# Patient Record
Sex: MALE | Race: BLACK OR AFRICAN AMERICAN | NOT HISPANIC OR LATINO | URBAN - METROPOLITAN AREA
[De-identification: names, ages, dates, MRNs, and addresses within clinical notes are randomized per-mention and may not be internally consistent; named-entity substitution may affect disease eponyms.]

---

## 2020-02-28 DIAGNOSIS — M24.661 ARTHROFIBROSIS OF KNEE JOINT, RIGHT: Primary | ICD-10-CM

## 2020-03-17 ENCOUNTER — CLINICAL SUPPORT (OUTPATIENT)
Dept: REHABILITATION | Facility: HOSPITAL | Age: 35
End: 2020-03-17
Payer: MEDICAID

## 2020-03-17 DIAGNOSIS — R26.9 GAIT DIFFICULTY: ICD-10-CM

## 2020-03-17 DIAGNOSIS — M25.661 STIFFNESS OF RIGHT KNEE: ICD-10-CM

## 2020-03-17 DIAGNOSIS — M25.561 CHRONIC PAIN OF RIGHT KNEE: ICD-10-CM

## 2020-03-17 DIAGNOSIS — R29.898 WEAKNESS OF RIGHT LOWER EXTREMITY: ICD-10-CM

## 2020-03-17 DIAGNOSIS — G89.29 CHRONIC PAIN OF RIGHT KNEE: ICD-10-CM

## 2020-03-17 PROCEDURE — 97110 THERAPEUTIC EXERCISES: CPT | Mod: PN

## 2020-03-17 PROCEDURE — 97161 PT EVAL LOW COMPLEX 20 MIN: CPT | Mod: PN

## 2020-03-17 NOTE — PROGRESS NOTES
[facility]  Physical Therapy Initial Evaluation    Name: Suhail Elmore  Clinic Number: [id number]    Therapy Diagnosis:   Encounter Diagnoses   Name Primary?    Stiffness of right knee     Chronic pain of right knee     Gait difficulty     Weakness of right lower extremity      Physician: Mike, Provider    Physician Orders: PT Eval and Treat   Medical Diagnosis from Referral: Arthrofibrosis of knee joint, right  Evaluation Date: 3/17/2020  Authorization Period Expiration: 2-  Plan of Care Expiration: 6-  Visit # / Visits authorized: 1/ 1    Time In: 12:00 pm  Time Out: 1:00 pm  Total Billable Time: 60 minutes    Precautions: Standard    Subjective   Date of onset: 2018   History of current condition - Suhail reports: that he had surgery on R knee. He has surgery similar to TKA. Pt states he was walking better, but he fell down in 2019, which caused a lot complication to R knee. Pt states that after he fell down, his has build up heterotropic ossification. Pt ambulates with hinged brace and compression stocking and SPC. Pt has been wearing SPC for about 1 year and half. Pt states that physician order him to order brace at up to 90 deg. Pt was instructed wear brace at all time. Pt reports he cannot standing, walk or sit down for long period of time. Pt states he feels sharp pain at R add, anterior quad and anterior knee. Pt states only the R foot swollen up. Pt has not fallen for over 2 year.      Medical History:   Past Medical History:   Diagnosis Date    Hypertension        Surgical History:   Suhail Elmore  has no past surgical history on file.    Medications:   Suhail has a current medication list which includes the following prescription(s): furosemide, lisinopril-hydrochlorothiazide, and potassium chloride.    Allergies:   Review of patient's allergies indicates:  No Known Allergies     Imaging, bone scan films: None       Prior Therapy: Yes.   Social  History:  lives with their family  Occupation: No work   Prior Level of Function: Independent   Current Level of Function: ADL's and IADL's     Pain:  Current 5/10, worst 10/10, best 4/10   Location: right knee   Description: Sharp  Aggravating Factors: Sitting, Standing, Bending, Walking, Morning, Extension and Flexing  Easing Factors:  ibuprofen       Pts goals: improve R knee flexion and extension and get rid of cane.     Objective       Observation:       Posture Alignment: Increase knee valgus and R foot inversion     Sensation: intact to light touch    DTR: intact   GAIT DEVIATIONS: Suhail displays antalgic gait;unsteady gait;decreased step length;decreased base of support    Range of Motion:   Knee Left active   Flexion 115 deg    Extension 00     Knee Right active   Flexion 61   Extension Lacking -15 deg        Lower Extremity Strength   Right LE  Left LE    Knee extension: 4-/5 Knee extension: 4+/5   Knee flexion: 4-/5 Knee flexion: 4+/5   Hip flexion: 4+/5 Hip flexion: 4+/5   Hip extension:  4-/5 Hip extension: 4+/5   Hip abduction: 4-/5 Hip abduction: 4+/5   Hip adduction: 4-/5 Hip adduction 4+/5   Ankle dorsiflexion: 4+/5 Ankle dorsiflexion: 4+/5   Ankle plantarflexion: 4+/5 Ankle plantarflexion: 4+/5       Step down test: Positive R LE  Squat: Positive R LE  Single leg balance: Positive R LE    Joint Mobility:    Patellar sup./inf: Decrease mobility   Patellar med/lat decrease mobility     Palpation: Increase R anterior quadriceps pain and increase R patella  tendon pain.     CMS Impairment/Limitation/Restriction for FOTO Knee Survey  Status Limitation G-Code CMS Severity Modifier  Intake 42% 58% Current Status CK - At least 40 percent but less than 60 percent  Predicted 62% 38% Goal Status+ CJ - At least 20 percent but less than 40 percent  D/C Status CK **only report if this is a one time visit    TREATMENT   Treatment Time In: 12:30 pm  Treatment Time Out:12:55 pm  Total Treatment time separate  from Evaluation: 25 minutes    Suhail received therapeutic exercises to develop strength, endurance, ROM and flexibility for 25 minutes including:    Heel slides AAROM 5 min   Quad sets towel under heel 3x15  Heel props 5 min   Prone hip flexors stretches 5x30 sec   Seated AAROM knee flexion 3x10 hold count 5 sec   Pt education and proper technique to elevate and ice Stacey Jang received hot or coldpack for 5 minutes to R knee.    Home Exercises and Patient Education Provided    Education provided:   - Perform hep 2 times per day    Written Home Exercises Provided: Patient instructed to cont prior HEP.  Exercises were reviewed and Suhail was able to demonstrate them prior to the end of the session.  Suhail demonstrated good  understanding of the education provided.     See EMR under Patient Instructions for exercises provided 3/17/2020.    Assessment   Suhail is a 35 y.o. male referred to outpatient Physical Therapy with a medical diagnosis of Arthrofibrosis of knee joint, right. Pt presents to therapy with R knee stiffness, pain, and decrease ROM after have surgery about 2 years ago. Pt has heterotropic ossification of R quad. Pt ambulated with SPC, hinged brace, and compression stocking. Pt is lacking -15 deg of R knee extension, and pt presented with 61 deg of R knee flexion. During palpation, pt presented with increase R hip abd, quad, ITB, and hip add msucle tightness and tissue restriction. Increase for anterior quad pain at patella tendon and quad tendon. Pt will benefit from skilled PT services to decrease functional limitations.     Pt prognosis is Fair Plus  Pt will benefit from skilled outpatient Physical Therapy to address the deficits stated above and in the chart below, provide pt/family education, and to maximize pt's level of independence.     Plan of care discussed with patient: Yes  Pt's spiritual, cultural and educational needs considered and patient is agreeable to the plan of care and  goals as stated below:     Anticipated Barriers for therapy: stiffness    Medical Necessity is demonstrated by the following  History  Co-morbidities and personal factors that may impact the plan of care Co-morbidities:   prior hx of knee surgery     Personal Factors:   no deficits     low   Examination  Body Structures and Functions, activity limitations and participation restrictions that may impact the plan of care Body Regions:   knee    Body Systems:    ROM  gait    Participation Restrictions:   Community ambulation     Activity limitations:   Learning and applying knowledge  no deficits    General Tasks and Commands  no deficits    Communication  no deficits    Mobility  walking    Self care  no deficits    Domestic Life  house shores     Interactions/Relationships  no deficits    Life Areas  no deficits    Community and Social Life  community life  recreation and leisure         Complexity: low   Clinical Presentation stable and uncomplicated low   Decision Making/ Complexity Score: low       GOALS: Short Term Goals:  4 weeks  1.Report decreased in pain at worse less than  <   / =  5  /10  to increase tolerance for functional mobility.On going  2. Pt to improve R knee flexion range of motion to 70 deg and R knee extension to -10 deg to allow for improved functional mobility to allow for improvement in IADLs. .On going  3. Increased R knee MMT 1/2 grade to increase tolerance for ADL and work activities.On going  4. Pt to tolerate HEP to improve ROM and independence with ADL's.On going    Long Term Goals: 8 weeks  1.Report decreased in pain at worse less than  <   / =  2 /10  to increase tolerance for functional mobility.  2.Pt to improve R knee flexion range of motion to 70 deg and R knee extension to -10 deg to allow for improved functional mobility to allow for improvement in IADLs. .On going  3.Increased Rk nee MMT 1 grade to increase tolerance for ADL and work activities.  4. Pt will report At least 40  percent but less than 60 percent  on LEFS  to demonstrate increase in LE function with every day tasks.   5. Pt to be Independent with HEP to improve ROM and independence with ADL's    Plan   Plan of care Certification: 3/17/2020 to 6-.    Outpatient Physical Therapy 2 times weekly for 12 weeks to include the following interventions: Gait Training, Manual Therapy, Moist Heat/ Ice, Neuromuscular Re-ed, Patient Education, Therapeutic Activites and Therapeutic Exercise.       Johnathan Gonsales, PT

## 2020-03-20 ENCOUNTER — DOCUMENTATION ONLY (OUTPATIENT)
Dept: REHABILITATION | Facility: HOSPITAL | Age: 35
End: 2020-03-20

## 2020-03-20 DIAGNOSIS — M25.661 STIFFNESS OF RIGHT KNEE: ICD-10-CM

## 2020-03-20 DIAGNOSIS — M25.561 CHRONIC PAIN OF RIGHT KNEE: ICD-10-CM

## 2020-03-20 DIAGNOSIS — R29.898 WEAKNESS OF RIGHT LOWER EXTREMITY: ICD-10-CM

## 2020-03-20 DIAGNOSIS — R26.9 GAIT DIFFICULTY: ICD-10-CM

## 2020-03-20 DIAGNOSIS — G89.29 CHRONIC PAIN OF RIGHT KNEE: ICD-10-CM

## 2020-03-20 NOTE — PROGRESS NOTES
Patient: Suhail Elmore  Date: 3/20/2020  Diagnosis:   1. Stiffness of right knee     2. Chronic pain of right knee     3. Gait difficulty     4. Weakness of right lower extremity       MRN: [id number]    Spoke with patient due to therapy following updates regarding COVID-19 closely and taking every precaution to ensure the safety of our patients, staff and community.  In an abundance of caution and in an effort to help reduce risk and limit community spread, we have decided to temporarily postpone appointments for patients who may be at increased risk to attend in-person therapy or where therapy is not critically needed at this time. Plan of care and home exercise program were reviewed and patient has what they need to continue therapy at home. All patient questions were answered. Also stated to patient that we are exploring virtual methods of providing care and will be in touch over the next few weeks. Patient verbalized understanding to all.    Grover Torres, PTA  3/20/2020

## 2020-03-24 PROBLEM — G89.29 CHRONIC PAIN OF RIGHT KNEE: Status: RESOLVED | Noted: 2020-03-17 | Resolved: 2020-03-24

## 2020-03-24 PROBLEM — R29.898 WEAKNESS OF RIGHT LOWER EXTREMITY: Status: RESOLVED | Noted: 2020-03-17 | Resolved: 2020-03-24

## 2020-03-24 PROBLEM — R26.9 GAIT DIFFICULTY: Status: RESOLVED | Noted: 2020-03-17 | Resolved: 2020-03-24

## 2020-03-24 PROBLEM — M25.661 STIFFNESS OF RIGHT KNEE: Status: RESOLVED | Noted: 2020-03-17 | Resolved: 2020-03-24

## 2020-03-24 PROBLEM — M25.561 CHRONIC PAIN OF RIGHT KNEE: Status: RESOLVED | Noted: 2020-03-17 | Resolved: 2020-03-24

## 2024-06-18 ENCOUNTER — HOSPITAL ENCOUNTER (EMERGENCY)
Facility: HOSPITAL | Age: 39
Discharge: HOME | End: 2024-06-18
Attending: EMERGENCY MEDICINE
Payer: MEDICARE

## 2024-06-18 VITALS
WEIGHT: 315 LBS | DIASTOLIC BLOOD PRESSURE: 76 MMHG | RESPIRATION RATE: 17 BRPM | HEIGHT: 67 IN | BODY MASS INDEX: 49.44 KG/M2 | TEMPERATURE: 98 F | SYSTOLIC BLOOD PRESSURE: 138 MMHG | HEART RATE: 77 BPM | OXYGEN SATURATION: 97 %
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
## 2024-06-18 DIAGNOSIS — S20.222A CONTUSION OF LEFT SIDE OF BACK, INITIAL ENCOUNTER: Primary | ICD-10-CM

## 2024-06-18 PROCEDURE — 99284 EMERGENCY DEPT VISIT MOD MDM: CPT | Mod: 25

## 2024-06-18 PROCEDURE — 25000003 PHARM REV CODE 250: Performed by: PHYSICIAN ASSISTANT

## 2024-06-18 RX ORDER — ACETAMINOPHEN 325 MG/1
650 TABLET ORAL
Status: COMPLETED | OUTPATIENT
Start: 2024-06-18 | End: 2024-06-18

## 2024-06-18 RX ORDER — ORPHENADRINE CITRATE 100 MG/1
100 TABLET, EXTENDED RELEASE ORAL 2 TIMES DAILY
Qty: 8 TABLET | Refills: 0 | Status: SHIPPED | OUTPATIENT
Start: 2024-06-18 | End: 2024-06-22

## 2024-06-18 RX ORDER — MELOXICAM 7.5 MG/1
7.5 TABLET ORAL DAILY
Qty: 12 TABLET | Refills: 0 | Status: SHIPPED | OUTPATIENT
Start: 2024-06-18

## 2024-06-18 RX ADMIN — ACETAMINOPHEN 650 MG: 325 TABLET ORAL at 06:06

## 2024-06-18 NOTE — ED PROVIDER NOTES
Encounter Date: 6/18/2024    SCRIBE #1 NOTE: I, Trini Alvaradodaniel Katz, am scribing for, and in the presence of,  Reece Ellis PA-C. I have scribed the following portions of the note - Other sections scribed: HPI, ROS.       History     Chief Complaint   Patient presents with    Fall     Was using rolling aid and fell backwards landing on ceramic tile appx 30 minutes PTA. Having pain to LEFT thoracic back. Worse with movement and inspiration. Denies SOB, hitting head, or LOC.      39 y. o. male with a PMHx of HTN, who presents to the ED for a chief complaint of middle back pain s/p ground-level fall that happened an hour ago. Patient reports he was using rolling aid and falling backwards on his back on ceramic tile. Further reports experiencing shortness of breath immediately after the fall secondary to pain (currently resolved). Patient also reports his back pain is exacerbated with movement and inspiration. No attempted Tx for the Sx. No other alleviating or exacerbating factors. Denies any head trauma or LOC. Further denies any nausea, vomiting, fever, or other associated symptoms. NKDA.     The history is provided by the patient. No  was used.     Review of patient's allergies indicates:  No Known Allergies  Past Medical History:   Diagnosis Date    Hypertension      No past surgical history on file.  No family history on file.  Social History     Tobacco Use    Smoking status: Never    Smokeless tobacco: Never   Substance Use Topics    Alcohol use: No    Drug use: No     Review of Systems   Constitutional:  Negative for fever.   HENT:  Negative for congestion, sore throat and trouble swallowing.    Respiratory:  Positive for shortness of breath (is currently resolved). Negative for cough.    Cardiovascular:  Negative for chest pain.   Gastrointestinal:  Negative for abdominal pain, constipation, diarrhea, nausea and vomiting.   Genitourinary:  Negative for dysuria, flank pain, frequency and  urgency.   Musculoskeletal:  Positive for back pain (middle back).   Skin:  Negative for rash.   Neurological:  Negative for syncope and headaches.   All other systems reviewed and are negative.      Physical Exam     Initial Vitals [06/18/24 1656]   BP Pulse Resp Temp SpO2   138/76 77 17 98.3 °F (36.8 °C) 97 %      MAP       --         Physical Exam    Nursing note and vitals reviewed.  Constitutional: He appears well-developed and well-nourished. He is not diaphoretic. No distress.   HENT:   Head: Atraumatic.   Right Ear: External ear normal.   Left Ear: External ear normal.   Eyes: Conjunctivae are normal.   Neck: No tracheal deviation present.   Normal range of motion.  Cardiovascular:  Normal rate and regular rhythm.           Pulmonary/Chest: No accessory muscle usage or stridor. No tachypnea. No respiratory distress.   Musculoskeletal:      Cervical back: Normal range of motion.      Comments: Positional tenderness to the left thoracic myofascial structures without midline tenderness of spine.  No bruising or erythema.  No vesicular lesions.     Neurological: He has normal strength. He displays no tremor. He displays no seizure activity. Coordination and gait normal.   Skin: Skin is intact. Capillary refill takes less than 2 seconds. No cyanosis.         ED Course   Procedures  Labs Reviewed - No data to display       Imaging Results              X-Ray Thoracic Spine AP Lateral (Final result)  Result time 06/18/24 17:47:18      Final result by Shawn Galeano MD (06/18/24 17:47:18)                   Impression:      No acute findings evident in the thoracic spine.      Electronically signed by: Shawn Galeano  Date:    06/18/2024  Time:    17:47               Narrative:    EXAMINATION:  XR THORACIC SPINE AP LATERAL    CLINICAL HISTORY:  Unspecified fall, initial encounter    TECHNIQUE:  AP and lateral views of the thoracic spine were performed.    COMPARISON:  None    FINDINGS:  Alignment appears  anatomic.  Mild generalized spondylosis.  No compression fracture.  No pedicle destruction.  Catheter projects over the right side of the heart.                                       X-Ray Thoracolumbar Spine AP Lateral (Final result)  Result time 06/18/24 17:38:24      Final result by Brianne Stern MD (06/18/24 17:38:24)                   Impression:      As above described.      Electronically signed by: Brianne Stern  Date:    06/18/2024  Time:    17:38               Narrative:    EXAMINATION:  THORACOLUMBAR SPINE    CLINICAL HISTORY:  Unspecified fall, initial encounter    TECHNIQUE:  AP and lateral views of the thoracolumbar spine    COMPARISON:  None.    FINDINGS:  There is straightening of the normal thoracolumbar spine.  There is no definite acute fracture.  There is grade 1 retrolisthesis of L1 on L2 and L2 on L3.  At the anterior inferior endplate of L1, the corner is flattened, which may be a normal variant.  No adjacent fragment is detected.  The bones are normally mineralized.                                       Medications   acetaminophen tablet 650 mg (650 mg Oral Given 6/18/24 1801)     Medical Decision Making  Thoracic contusion.  Screening x-ray shows no bony instability or pneumothorax.  No focal deficits.  Reassurance.    Amount and/or Complexity of Data Reviewed  Radiology: ordered. Decision-making details documented in ED Course.    Risk  OTC drugs.  Prescription drug management.            Scribe Attestation:   Scribe #1: I performed the above scribed service and the documentation accurately describes the services I performed. I attest to the accuracy of the note.    Scribe attestation: I, Reece Ellis PA-C, personally performed the services described in this documentation. All medical record entries made by the scribe were at my direction and in my presence.  I have reviewed the chart and agree that the record reflects my personal performance and is accurate and complete.                              Clinical Impression:  Final diagnoses:  [W19.XXXA] Fall  [S20.222A] Contusion of left side of back, initial encounter (Primary)          ED Disposition Condition    Discharge Stable          ED Prescriptions       Medication Sig Dispense Start Date End Date Auth. Provider    orphenadrine (NORFLEX) 100 mg tablet Take 1 tablet (100 mg total) by mouth 2 (two) times daily. for 4 days 8 tablet 6/18/2024 6/22/2024 Reece Ellis PA-C    meloxicam (MOBIC) 7.5 MG tablet Take 1 tablet (7.5 mg total) by mouth once daily. 12 tablet 6/18/2024 -- Reece Ellis PA-C          Follow-up Information       Follow up With Specialties Details Why Contact Info    Antonio Patten MD General Practice Schedule an appointment as soon as possible for a visit in 1 day For re-evaluation [address] [facility] [address]  [phone]      [facility] [place] - Emergency Dept Emergency Medicine Go to  If symptoms worsen or new symptoms develop [address] [facility] [place] [address] [phone]             Reece Ellis PA-C  06/18/24 2068

## 2024-06-18 NOTE — DISCHARGE INSTRUCTIONS
Thank you for coming to our Emergency Department today. It is important to remember that some problems or medical conditions are difficult to diagnose and may not be found or addressed during your Emergency Department visit.  These conditions often start with non-specific symptoms and can only be diagnosed on follow up visits with your primary care physician or specialist when the symptoms continue or change. Please remember that all medical conditions can change, and we cannot predict how you will be feeling tomorrow or the next day. Return to the ER with any questions/concerns, new/concerning symptoms, worsening or failure to improve.       Be sure to follow up with your primary care doctor and review all labs/imaging/tests that were performed during your ER visit with them. It is very common for us to identify non-emergent incidental findings which must be followed up with your primary care physician.  Some labs/imaging/tests may be outside of the normal range, and require non-emergent follow-up and/or further investigation/treatment/procedures/testing to help diagnose/exclude/prevent complications or other potentially serious medical conditions. Some abnormalities may not have been discussed or addressed during your ER visit.     An ER visit does not replace a primary care visit, and many screening tests or follow-up tests cannot be ordered by an ER doctor or performed by the ER. Some tests may even require pre-approval.    If you do not have a primary care doctor, you may contact the one listed on your discharge paperwork or you may also call the [facility] Appointment Desk at [phone] , or [address] [place] [address] at  [phone] to schedule an appointment, or establish care with a primary care doctor or even a specialist and to obtain information about local resources. It is important to your health that you have a primary care doctor.    Please take all medications as directed. We have done our best to select  a medication for you that will treat your condition however, all medications may potentially have side-effects and it is impossible to predict which medications may give you side-effects or what those side-effects (if any) those medications may give you.  If you feel that you are having a negative effect or side-effect of any medication you should stop taking those medications immediately and seek medical attention. If you feel that you are having a life-threatening reaction call 911.        Do not drive, swim, climb to height, take a bath, operate heavy machinery, drink alcohol or take potentially sedating medications, sign any legal documents or make any important decisions for 24 hours if you have received any pain medications, sedatives or mood altering drugs during your ER visit or within 24 hours of taking them if they have been prescribed to you.     You can find additional resources for Dentists, hearing aids, durable medical equipment, low cost pharmacies and other resources at [email or web address]

## 2024-06-18 NOTE — ED TRIAGE NOTES
Pt c/o 6/10 middle back pain s/p falling backwards on his back. Pt denies hitting head, LOC, or use of blood thinners. Pt using a rolator to ambulate d/t only having left leg.

## 2024-08-31 ENCOUNTER — HOSPITAL ENCOUNTER (EMERGENCY)
Facility: HOSPITAL | Age: 39
Discharge: HOME | End: 2024-08-31
Attending: EMERGENCY MEDICINE
Payer: MEDICARE

## 2024-08-31 VITALS
HEART RATE: 79 BPM | WEIGHT: 306 LBS | HEIGHT: 66 IN | SYSTOLIC BLOOD PRESSURE: 138 MMHG | RESPIRATION RATE: 18 BRPM | BODY MASS INDEX: 49.18 KG/M2 | OXYGEN SATURATION: 97 % | DIASTOLIC BLOOD PRESSURE: 75 MMHG | TEMPERATURE: 99 F

## 2024-08-31 DIAGNOSIS — E86.0 DEHYDRATION: ICD-10-CM

## 2024-08-31 DIAGNOSIS — K65.4 MESENTERIC PANNICULITIS: ICD-10-CM

## 2024-08-31 DIAGNOSIS — R11.2 NAUSEA, VOMITING, AND DIARRHEA: Primary | ICD-10-CM

## 2024-08-31 DIAGNOSIS — N28.9 DECREASED RENAL FUNCTION: ICD-10-CM

## 2024-08-31 DIAGNOSIS — R19.7 NAUSEA, VOMITING, AND DIARRHEA: Primary | ICD-10-CM

## 2024-08-31 PROBLEM — I12.9 HYPERTENSIVE RENAL DISEASE: Status: ACTIVE | Noted: 2024-08-29

## 2024-08-31 PROBLEM — Z89.621: Status: ACTIVE | Noted: 2022-10-13

## 2024-08-31 PROBLEM — C40.21: Status: ACTIVE | Noted: 2024-04-17

## 2024-08-31 PROBLEM — C40.21: Status: ACTIVE | Noted: 2022-10-04

## 2024-08-31 PROBLEM — E83.51 HYPOCALCEMIA: Status: ACTIVE | Noted: 2022-12-26

## 2024-08-31 PROBLEM — Z51.11 ADMISSION FOR ANTINEOPLASTIC CHEMOTHERAPY: Status: ACTIVE | Noted: 2022-10-17

## 2024-08-31 PROBLEM — N52.9 ED (ERECTILE DYSFUNCTION) OF ORGANIC ORIGIN: Status: ACTIVE | Noted: 2021-11-10

## 2024-08-31 PROBLEM — N18.31 STAGE 3A CHRONIC KIDNEY DISEASE: Status: ACTIVE | Noted: 2024-08-29

## 2024-08-31 PROBLEM — E83.42 HYPOMAGNESEMIA: Status: ACTIVE | Noted: 2022-12-29

## 2024-08-31 PROBLEM — M61.00 POSTOPERATIVE HETEROTOPIC OSSIFICATION OF MUSCLE: Status: ACTIVE | Noted: 2020-02-20

## 2024-08-31 PROBLEM — Z92.89 HISTORY OF BLOOD TRANSFUSION: Status: ACTIVE | Noted: 2024-08-29

## 2024-08-31 PROBLEM — C78.02 MALIGNANT NEOPLASM METASTATIC TO BOTH LUNGS: Status: ACTIVE | Noted: 2023-01-31

## 2024-08-31 PROBLEM — C78.2: Status: ACTIVE | Noted: 2023-09-20

## 2024-08-31 PROBLEM — Z96.651 HISTORY OF ARTHROPLASTY OF RIGHT KNEE: Status: ACTIVE | Noted: 2019-08-15

## 2024-08-31 PROBLEM — Z89.9: Status: ACTIVE | Noted: 2024-08-29

## 2024-08-31 PROBLEM — E78.5 HYPERLIPIDEMIA: Status: ACTIVE | Noted: 2022-10-07

## 2024-08-31 PROBLEM — E66.01 MORBID OBESITY: Status: ACTIVE | Noted: 2022-09-18

## 2024-08-31 PROBLEM — I10 HYPERTENSION: Status: ACTIVE | Noted: 2022-10-07

## 2024-08-31 PROBLEM — C78.01 MALIGNANT NEOPLASM METASTATIC TO BOTH LUNGS: Status: ACTIVE | Noted: 2023-01-31

## 2024-08-31 LAB
ALBUMIN SERPL BCP-MCNC: 3.5 G/DL (ref 3.5–5.2)
ALP SERPL-CCNC: 52 U/L (ref 55–135)
ALT SERPL W/O P-5'-P-CCNC: 26 U/L (ref 10–44)
ANION GAP SERPL CALC-SCNC: 10 MMOL/L (ref 8–16)
AST SERPL-CCNC: 18 U/L (ref 10–40)
BACTERIA #/AREA URNS HPF: ABNORMAL /HPF
BACTERIA #/AREA URNS HPF: ABNORMAL /HPF
BASOPHILS # BLD AUTO: 0.01 K/UL (ref 0–0.2)
BASOPHILS NFR BLD: 0.2 % (ref 0–1.9)
BILIRUB SERPL-MCNC: 0.3 MG/DL (ref 0.1–1)
BILIRUB UR QL STRIP: NEGATIVE
BILIRUB UR QL STRIP: NEGATIVE
BUN SERPL-MCNC: 17 MG/DL (ref 6–20)
CALCIUM SERPL-MCNC: 8.1 MG/DL (ref 8.7–10.5)
CHLORIDE SERPL-SCNC: 103 MMOL/L (ref 95–110)
CLARITY UR: CLEAR
CLARITY UR: CLEAR
CO2 SERPL-SCNC: 28 MMOL/L (ref 23–29)
COLOR UR: YELLOW
COLOR UR: YELLOW
CREAT SERPL-MCNC: 1.8 MG/DL (ref 0.5–1.4)
DIFFERENTIAL METHOD BLD: ABNORMAL
EOSINOPHIL # BLD AUTO: 0.1 K/UL (ref 0–0.5)
EOSINOPHIL NFR BLD: 1.6 % (ref 0–8)
ERYTHROCYTE [DISTWIDTH] IN BLOOD BY AUTOMATED COUNT: 15.1 % (ref 11.5–14.5)
EST. GFR  (NO RACE VARIABLE): 48 ML/MIN/1.73 M^2
GLUCOSE SERPL-MCNC: 101 MG/DL (ref 70–110)
GLUCOSE UR QL STRIP: NEGATIVE
GLUCOSE UR QL STRIP: NEGATIVE
HCT VFR BLD AUTO: 39.4 % (ref 40–54)
HGB BLD-MCNC: 12.5 G/DL (ref 14–18)
HGB UR QL STRIP: ABNORMAL
HGB UR QL STRIP: ABNORMAL
HYALINE CASTS #/AREA URNS LPF: 1 /LPF
HYALINE CASTS #/AREA URNS LPF: 5 /LPF
IMM GRANULOCYTES # BLD AUTO: 0.04 K/UL (ref 0–0.04)
IMM GRANULOCYTES NFR BLD AUTO: 0.8 % (ref 0–0.5)
KETONES UR QL STRIP: NEGATIVE
KETONES UR QL STRIP: NEGATIVE
LEUKOCYTE ESTERASE UR QL STRIP: NEGATIVE
LEUKOCYTE ESTERASE UR QL STRIP: NEGATIVE
LIPASE SERPL-CCNC: 28 U/L (ref 4–60)
LYMPHOCYTES # BLD AUTO: 0.9 K/UL (ref 1–4.8)
LYMPHOCYTES NFR BLD: 18.5 % (ref 18–48)
MCH RBC QN AUTO: 26.5 PG (ref 27–31)
MCHC RBC AUTO-ENTMCNC: 31.7 G/DL (ref 32–36)
MCV RBC AUTO: 84 FL (ref 82–98)
MICROSCOPIC COMMENT: ABNORMAL
MICROSCOPIC COMMENT: ABNORMAL
MONOCYTES # BLD AUTO: 0.6 K/UL (ref 0.3–1)
MONOCYTES NFR BLD: 11.9 % (ref 4–15)
NEUTROPHILS # BLD AUTO: 3.3 K/UL (ref 1.8–7.7)
NEUTROPHILS NFR BLD: 67 % (ref 38–73)
NITRITE UR QL STRIP: NEGATIVE
NITRITE UR QL STRIP: NEGATIVE
NRBC BLD-RTO: 0 /100 WBC
PH UR STRIP: 6 [PH] (ref 5–8)
PH UR STRIP: 6 [PH] (ref 5–8)
PLATELET # BLD AUTO: 182 K/UL (ref 150–450)
PMV BLD AUTO: 10.1 FL (ref 9.2–12.9)
POTASSIUM SERPL-SCNC: 3.2 MMOL/L (ref 3.5–5.1)
PROT SERPL-MCNC: 7.4 G/DL (ref 6–8.4)
PROT UR QL STRIP: ABNORMAL
PROT UR QL STRIP: ABNORMAL
RBC # BLD AUTO: 4.71 M/UL (ref 4.6–6.2)
RBC #/AREA URNS HPF: 3 /HPF (ref 0–4)
RBC #/AREA URNS HPF: 6 /HPF (ref 0–4)
SODIUM SERPL-SCNC: 141 MMOL/L (ref 136–145)
SP GR UR STRIP: 1.02 (ref 1–1.03)
SP GR UR STRIP: 1.02 (ref 1–1.03)
SQUAMOUS #/AREA URNS HPF: 1 /HPF
SQUAMOUS #/AREA URNS HPF: 1 /HPF
URN SPEC COLLECT METH UR: ABNORMAL
URN SPEC COLLECT METH UR: ABNORMAL
UROBILINOGEN UR STRIP-ACNC: NEGATIVE EU/DL
UROBILINOGEN UR STRIP-ACNC: NEGATIVE EU/DL
WBC # BLD AUTO: 4.96 K/UL (ref 3.9–12.7)
WBC #/AREA URNS HPF: 5 /HPF (ref 0–5)
WBC #/AREA URNS HPF: 6 /HPF (ref 0–5)

## 2024-08-31 PROCEDURE — 83690 ASSAY OF LIPASE: CPT | Performed by: NURSE PRACTITIONER

## 2024-08-31 PROCEDURE — 25000003 PHARM REV CODE 250: Performed by: NURSE PRACTITIONER

## 2024-08-31 PROCEDURE — 63600175 PHARM REV CODE 636 W HCPCS: Performed by: NURSE PRACTITIONER

## 2024-08-31 PROCEDURE — 85025 COMPLETE CBC W/AUTO DIFF WBC: CPT | Performed by: NURSE PRACTITIONER

## 2024-08-31 PROCEDURE — 96374 THER/PROPH/DIAG INJ IV PUSH: CPT | Mod: 59

## 2024-08-31 PROCEDURE — 80053 COMPREHEN METABOLIC PANEL: CPT | Performed by: NURSE PRACTITIONER

## 2024-08-31 PROCEDURE — 81000 URINALYSIS NONAUTO W/SCOPE: CPT | Performed by: NURSE PRACTITIONER

## 2024-08-31 PROCEDURE — 96361 HYDRATE IV INFUSION ADD-ON: CPT

## 2024-08-31 PROCEDURE — 81000 URINALYSIS NONAUTO W/SCOPE: CPT | Mod: 91 | Performed by: PHYSICIAN ASSISTANT

## 2024-08-31 PROCEDURE — 99285 EMERGENCY DEPT VISIT HI MDM: CPT | Mod: 25

## 2024-08-31 PROCEDURE — 96375 TX/PRO/DX INJ NEW DRUG ADDON: CPT

## 2024-08-31 PROCEDURE — 25500020 PHARM REV CODE 255: Performed by: NURSE PRACTITIONER

## 2024-08-31 RX ORDER — HYDROCODONE BITARTRATE AND ACETAMINOPHEN 5; 325 MG/1; MG/1
1 TABLET ORAL EVERY 6 HOURS PRN
Qty: 12 TABLET | Refills: 0 | Status: SHIPPED | OUTPATIENT
Start: 2024-08-31

## 2024-08-31 RX ORDER — MORPHINE SULFATE 4 MG/ML
5 INJECTION, SOLUTION INTRAMUSCULAR; INTRAVENOUS
Status: COMPLETED | OUTPATIENT
Start: 2024-08-31 | End: 2024-08-31

## 2024-08-31 RX ORDER — ONDANSETRON HYDROCHLORIDE 2 MG/ML
4 INJECTION, SOLUTION INTRAVENOUS
Status: COMPLETED | OUTPATIENT
Start: 2024-08-31 | End: 2024-08-31

## 2024-08-31 RX ORDER — KETOROLAC TROMETHAMINE 30 MG/ML
15 INJECTION, SOLUTION INTRAMUSCULAR; INTRAVENOUS
Status: COMPLETED | OUTPATIENT
Start: 2024-08-31 | End: 2024-08-31

## 2024-08-31 RX ADMIN — KETOROLAC TROMETHAMINE 15 MG: 30 INJECTION, SOLUTION INTRAMUSCULAR at 09:08

## 2024-08-31 RX ADMIN — SODIUM CHLORIDE 1000 ML: 9 INJECTION, SOLUTION INTRAVENOUS at 06:08

## 2024-08-31 RX ADMIN — MORPHINE SULFATE 5 MG: 4 INJECTION, SOLUTION INTRAMUSCULAR; INTRAVENOUS at 09:08

## 2024-08-31 RX ADMIN — IOHEXOL 100 ML: 350 INJECTION, SOLUTION INTRAVENOUS at 07:08

## 2024-08-31 RX ADMIN — SODIUM CHLORIDE, POTASSIUM CHLORIDE, SODIUM LACTATE AND CALCIUM CHLORIDE 1000 ML: 600; 310; 30; 20 INJECTION, SOLUTION INTRAVENOUS at 07:08

## 2024-08-31 RX ADMIN — ONDANSETRON 4 MG: 2 INJECTION INTRAMUSCULAR; INTRAVENOUS at 09:08

## 2024-08-31 NOTE — ED PROVIDER NOTES
"Encounter Date: 8/31/2024    SCRIBE #1 NOTE: I, Elana Ayala, am scribing for, and in the presence of,  Jamie Lorenzo NP. I have scribed the following portions of the note - Other sections scribed: HPI/ROS.       History     Chief Complaint   Patient presents with    Abdominal Pain    Diarrhea     Pt to ED from home with c/o abdominal pain accompanied by diarrhea x 2 days. Pt reports constantly having to have to use the restroom whether he consumes water or foods. Pt denies cp/sob     39 y.o. 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  Patient reports following abdominal pain he had episodes of emesis on Friday (none today). He also reports chills and says eating hurts his stomach. Pt currently describes abdominal pain like "somehting sitting there and wanting to come up". Pt has taken no medications for symptoms. Pt has had similar pain before due to dehydration from chemotherapy for bone cancer.  No sick contacts. Denies fever, nausea or other associated symptoms.       The history is provided by the patient.     Review of patient's allergies indicates:  No Known Allergies  Past Medical History:   Diagnosis Date    Hypertension     Hypertensive renal disease     Obesity, unspecified      History reviewed. No pertinent surgical history.  No family history on file.  Social History     Tobacco Use    Smoking status: Never    Smokeless tobacco: Never   Substance Use Topics    Alcohol use: No    Drug use: No     Review of Systems   Constitutional:  Positive for chills. Negative for fever.   HENT:  Negative for congestion, rhinorrhea and sore throat.    Eyes:  Negative for visual disturbance.   Respiratory:  Negative for cough and shortness of breath.    Cardiovascular:  Negative for chest pain.   Gastrointestinal:  Positive for abdominal pain (epigastric) and vomiting (resolved). " Negative for diarrhea and nausea.   Genitourinary:  Negative for dysuria, frequency and hematuria.   Musculoskeletal:  Negative for back pain.   Skin:  Negative for rash.   Neurological:  Negative for dizziness, weakness and headaches.       Physical Exam     Initial Vitals [08/31/24 1547]   BP Pulse Resp Temp SpO2   (!) 143/82 96 19 98.3 °F (36.8 °C) 96 %      MAP       --         Physical Exam    Nursing note and vitals reviewed.  Constitutional: He appears well-developed and well-nourished. He is not diaphoretic. No distress.   HENT:   Head: Normocephalic and atraumatic.   Right Ear: External ear normal.   Left Ear: External ear normal.   Nose: Nose normal.   Eyes: EOM are normal. Right eye exhibits no discharge. Left eye exhibits no discharge.   Neck: Neck supple. No tracheal deviation present.   Normal range of motion.  Cardiovascular:  Normal rate.           Pulmonary/Chest: No stridor. No respiratory distress.   Abdominal: Abdomen is soft. He exhibits no distension. There is no abdominal tenderness.   Musculoskeletal:         General: No tenderness. Normal range of motion.      Cervical back: Normal range of motion and neck supple.      Comments: Right lower extremity amputation     Neurological: He is alert and oriented to person, place, and time. He has normal strength. No cranial nerve deficit.   Skin: Skin is warm and dry.   Psychiatric: He has a normal mood and affect. His behavior is normal. Judgment and thought content normal.         ED Course   Procedures  Labs Reviewed   URINALYSIS, REFLEX TO URINE CULTURE - Abnormal       Result Value    Specimen UA Urine, Clean Catch      Color, UA Yellow      Appearance, UA Clear      pH, UA 6.0      Specific Gravity, UA 1.025      Protein, UA 1+ (*)     Glucose, UA Negative      Ketones, UA Negative      Bilirubin (UA) Negative      Occult Blood UA 1+ (*)     Nitrite, UA Negative      Urobilinogen, UA Negative      Leukocytes, UA Negative      Narrative:      Specimen Source->Urine   CBC W/ AUTO DIFFERENTIAL - Abnormal    WBC 4.96      RBC 4.71      Hemoglobin 12.5 (*)     Hematocrit 39.4 (*)     MCV 84      MCH 26.5 (*)     MCHC 31.7 (*)     RDW 15.1 (*)     Platelets 182      MPV 10.1      Immature Granulocytes 0.8 (*)     Gran # (ANC) 3.3      Immature Grans (Abs) 0.04      Lymph # 0.9 (*)     Mono # 0.6      Eos # 0.1      Baso # 0.01      nRBC 0      Gran % 67.0      Lymph % 18.5      Mono % 11.9      Eosinophil % 1.6      Basophil % 0.2      Differential Method Automated     COMPREHENSIVE METABOLIC PANEL - Abnormal    Sodium 141      Potassium 3.2 (*)     Chloride 103      CO2 28      Glucose 101      BUN 17      Creatinine 1.8 (*)     Calcium 8.1 (*)     Total Protein 7.4      Albumin 3.5      Total Bilirubin 0.3      Alkaline Phosphatase 52 (*)     AST 18      ALT 26      eGFR 48 (*)     Anion Gap 10     URINALYSIS, REFLEX TO URINE CULTURE - Abnormal    Specimen UA Urine, Clean Catch      Color, UA Yellow      Appearance, UA Clear      pH, UA 6.0      Specific Gravity, UA 1.025      Protein, UA 1+ (*)     Glucose, UA Negative      Ketones, UA Negative      Bilirubin (UA) Negative      Occult Blood UA 1+ (*)     Nitrite, UA Negative      Urobilinogen, UA Negative      Leukocytes, UA Negative      Narrative:     Specimen Source->Urine   URINALYSIS MICROSCOPIC - Abnormal    RBC, UA 6 (*)     WBC, UA 5      Bacteria None      Squam Epithel, UA 1      Hyaline Casts, UA 5 (*)     Microscopic Comment SEE COMMENT      Narrative:     Specimen Source->Urine   URINALYSIS MICROSCOPIC - Abnormal    RBC, UA 3      WBC, UA 6 (*)     Bacteria Rare      Squam Epithel, UA 1      Hyaline Casts, UA 1      Microscopic Comment SEE COMMENT      Narrative:     Specimen Source->Urine   LIPASE    Lipase 28            Imaging Results              CT Abdomen Pelvis With IV Contrast NO Oral Contrast (Final result)  Result time 08/31/24 19:07:20      Final result by Brianne Stern MD  (08/31/24 19:07:20)                   Impression:      Hepatomegaly.    Mild haziness of the mesenteric fat and small mesenteric lymph nodes.  Findings may or may not be related to mesenteric panniculitis.    Right basilar atelectatic changes and or scarring.      Electronically signed by: Brianne Stern  Date:    08/31/2024  Time:    19:07               Narrative:    EXAMINATION:  CT OF ABDOMEN PELVIS WITH    CLINICAL HISTORY:  Abdominal pain and diarrhea.  Epigastric pain;Metastatic disease evaluation;Nausea/vomiting;    TECHNIQUE:  5 mm enhanced axial images were obtained from the lung bases through the greater trochanters.  One hundred mL of Omnipaque 350 was injected.    COMPARISON:  None.    FINDINGS:  The liver is enlarged.  No intrahepatic biliary ductal dilatation is present.    Spleen, pancreas, kidneys, and adrenal glands are unremarkable. The gallbladder is surgically absent.    There is mild haziness of the mesenteric fat containing small mesenteric lymph nodes.    There is no definite evidence for abdominal adenopathy or ascites.    Surgical clips are seen in the right inguinal region.  The no right femur is seen.  There is scarring and or granulomatous tissue and the right inguinal region.  There are no pelvic masses or adenopathy.    There is no free fluid in the pelvis.    At the right lung base, there is bibasilar atelectasis and/or scarring.  There are calcified granulomas.  There is mild bibasilar atelectatic changes.                                       Medications   sodium chloride 0.9% bolus 1,000 mL 1,000 mL (0 mLs Intravenous Stopped 8/31/24 2046)   iohexoL (OMNIPAQUE 350) injection 100 mL (100 mLs Intravenous Given 8/31/24 1900)   lactated ringers bolus 1,000 mL (0 mLs Intravenous Stopped 8/31/24 2046)   ketorolac injection 15 mg (15 mg Intravenous Given 8/31/24 2106)   morphine injection 5 mg (5 mg Intravenous Given 8/31/24 2105)   ondansetron injection 4 mg (4 mg Intravenous Given  8/31/24 2106)     Medical Decision Making  CBC with mild anemia but otherwise unremarkable.  CMP with hypokalemia and elevated creatinine.  Possibly prerenal.  Patient has an appointment with Nephrology this coming week.  Ordered 2 L IV fluid bolus.  Urinalysis with evidence of dehydration but no evidence of infection.  CT with evidence of possible mesenteric panniculitis but no emergent intra-abdominal pathology.    Amount and/or Complexity of Data Reviewed  Labs: ordered. Decision-making details documented in ED Course.  Radiology: ordered. Decision-making details documented in ED Course.    Risk  Prescription drug management.            Scribe Attestation:   Scribe #1: I performed the above scribed service and the documentation accurately describes the services I performed. I attest to the accuracy of the note.                               Clinical Impression:  Final diagnoses:  [K65.4] Mesenteric panniculitis  [R11.2, R19.7] Nausea, vomiting, and diarrhea (Primary)  [E86.0] Dehydration  [N28.9] Decreased renal function       I, Jamie Lorenzo NP, personally performed the services described in this documentation. All medical record entries made by the scribe were at my direction and in my presence. I have reviewed the chart and agree that the record reflects my personal performance and is accurate and complete.      DISCLAIMER: This note was prepared with [employer] voice recognition transcription software. Garbled syntax, mangled pronouns, and other bizarre constructions may be attributed to that software system.     ED Disposition Condition    Discharge Stable          ED Prescriptions       Medication Sig Dispense Start Date End Date Auth. Provider    HYDROcodone-acetaminophen (NORCO) 5-325 mg per tablet Take 1 tablet by mouth every 6 (six) hours as needed for Pain. 12 tablet 8/31/2024 -- Jamie Lorenzo NP          Follow-up Information       Follow up With Specialties Details Why Contact Hannah Patten  Antonio CORCORAN MD General Practice Schedule an appointment as soon as possible for a visit in 3 days For further evaluation [address] [facility] [facility]  [place] [address]  [phone]      [facility] Emergency Dept Emergency Medicine Go to  If symptoms worsen, As needed [address] [facility] [place] [address]  [phone]             Jamie Lorenzo, NP  09/06/24 0651

## 2024-09-01 NOTE — DISCHARGE INSTRUCTIONS
Drink plenty of water and other fluids as discussed.  Follow up for repeat kidney function test as discussed.    Use nausea medications as needed.  Take pain medication as needed and only as prescribed.     Return to the emergency department immediately for any new or worsening symptoms.    Thank you for coming to our Emergency Department today. It is important to remember that some problems are difficult to diagnose and may not be found during your first visit. Be sure to follow up with your primary care doctor.  If you do not have one, you may contact the one listed on your discharge paperwork or you may also call the [facility] Appointment Desk at [phone] to schedule an appointment with one.     Return to the ER with any questions/concerns, new/concerning symptoms, worsening or failure to improve. Do not drive or make any important decisions for 24 hours if you have received any pain medications, sedatives or mood altering drugs during your ER visit.